# Patient Record
Sex: MALE | Race: WHITE | NOT HISPANIC OR LATINO | Employment: UNEMPLOYED | URBAN - NONMETROPOLITAN AREA
[De-identification: names, ages, dates, MRNs, and addresses within clinical notes are randomized per-mention and may not be internally consistent; named-entity substitution may affect disease eponyms.]

---

## 2020-01-01 ENCOUNTER — HOSPITAL ENCOUNTER (EMERGENCY)
Facility: HOSPITAL | Age: 65
End: 2020-08-16
Attending: EMERGENCY MEDICINE | Admitting: EMERGENCY MEDICINE

## 2020-01-01 VITALS — WEIGHT: 200 LBS | OXYGEN SATURATION: 82 % | TEMPERATURE: 98.6 F | BODY MASS INDEX: 27.09 KG/M2 | HEIGHT: 72 IN

## 2020-01-01 PROCEDURE — 94799 UNLISTED PULMONARY SVC/PX: CPT

## 2020-01-01 PROCEDURE — 99284 EMERGENCY DEPT VISIT MOD MDM: CPT

## 2020-01-01 PROCEDURE — 92950 HEART/LUNG RESUSCITATION CPR: CPT

## 2020-01-01 PROCEDURE — 99283 EMERGENCY DEPT VISIT LOW MDM: CPT

## 2020-01-01 PROCEDURE — 25010000002 EPINEPHRINE 1 MG/10ML SOLUTION PREFILLED SYRINGE: Performed by: EMERGENCY MEDICINE

## 2020-01-01 RX ORDER — EPINEPHRINE 0.1 MG/ML
SYRINGE (ML) INJECTION
Status: COMPLETED | OUTPATIENT
Start: 2020-01-01 | End: 2020-01-01

## 2020-01-01 RX ADMIN — Medication 1 MG: at 16:32

## 2020-08-16 NOTE — ED NOTES
Spoke with CAMILLE Schumacher who informs that at this time that he is not ruled out for donation at this time and to call back when there is a good time to speak with the wife.   Case ID# [id number]     Carisa Phillips RN  08/16/20 [id number]

## 2020-08-16 NOTE — ED NOTES
Spoke with Yadi at [facility]. Family declines donation at this time. Case number [id number]     Sylvia Urbina RN  08/16/20 4747

## 2020-08-16 NOTE — ED NOTES
Spoke with answering service to report death to Emiliano Biswas.     Sylvia Urbina, RN  08/16/20 [id number]

## 2020-08-16 NOTE — ED NOTES
[facility]  has been made aware and they are on their way to pick pt up.      Galina Qiu RN  [phone]

## 2020-08-16 NOTE — ED PROVIDER NOTES
Subjective   Patient is a 65-year-old male who presents by EMS in cardiac arrest.  They report that they arrived on the scene approximately 40 minutes prior to arriving here.  The patient had apparently been mowing grass, then was found by family to be lying on his deck, unresponsive and not breathing.  Family reportedly initiated CPR.  EMS arrived to find the patient pulseless, apneic, unresponsive.  Initially PEA on the monitor but this was short-lived, deteriorated even further into asystole in which he remained for the remainder of their patient contact.  They intubated the patient in the field, CPR was administered using the Huan machine.  He had several doses of intravenous epinephrine, intravenous sodium bicarbonate with no response.  No further acute history is available.          Review of Systems   Unable to perform ROS: Patient unresponsive       Past Medical History:   Diagnosis Date   • AVM (arteriovenous malformation)    • Stroke (CMS/HCC)        No Known Allergies    History reviewed. No pertinent surgical history.    History reviewed. No pertinent family history.    Social History     Socioeconomic History   • Marital status:      Spouse name: Not on file   • Number of children: Not on file   • Years of education: Not on file   • Highest education level: Not on file           Objective   Physical Exam   Constitutional:   Patient is a well-developed, well-nourished male who appears clinically dead.   HENT:   Head: Normocephalic.   Eyes:   Pupils are approximately 5 mm bilaterally, unreactive   Neck: No tracheal deviation present.   Cardiovascular:   No palpable pulses, no audible heart sounds, asystole on the monitor   Pulmonary/Chest:   No spontaneous respirations.  When bagged, breath sounds are clear and equal bilaterally.   Abdominal: Soft.   No borborygmi when bagged   Musculoskeletal: He exhibits no edema or deformity.   Neurological:   Unresponsive, clinically dead   Skin: Skin is dry.        Procedures           ED Course  ED Course as of Aug 16 1723   Sun Aug 16, 2020   1721 Asystole in all leads.  No signs of life.  Patient is pronounced dead on arrival.  Case is referred to the .  Patient's wife and daughter were notified.    [CM]      ED Course User Index  [CM] Mohan Scott MD                                           [facility]    Final diagnoses:   Dead on arrival             Please note that portions of this note were completed with a voice recognition program.        Mohan Scott MD  08/16/20 1727